# Patient Record
Sex: MALE | Race: OTHER
[De-identification: names, ages, dates, MRNs, and addresses within clinical notes are randomized per-mention and may not be internally consistent; named-entity substitution may affect disease eponyms.]

---

## 2022-01-08 ENCOUNTER — HOSPITAL ENCOUNTER (INPATIENT)
Dept: HOSPITAL 54 - ER | Age: 72
LOS: 2 days | Discharge: NURSING, REHAB OR LONG-TERM CARE | DRG: 202 | End: 2022-01-10
Attending: INTERNAL MEDICINE | Admitting: INTERNAL MEDICINE
Payer: COMMERCIAL

## 2022-01-08 VITALS — WEIGHT: 137 LBS | BODY MASS INDEX: 21.5 KG/M2 | HEIGHT: 67 IN

## 2022-01-08 DIAGNOSIS — K44.9: ICD-10-CM

## 2022-01-08 DIAGNOSIS — J20.9: Primary | ICD-10-CM

## 2022-01-08 DIAGNOSIS — L03.116: ICD-10-CM

## 2022-01-08 DIAGNOSIS — Z20.822: ICD-10-CM

## 2022-01-08 DIAGNOSIS — I10: ICD-10-CM

## 2022-01-08 DIAGNOSIS — J44.0: ICD-10-CM

## 2022-01-08 LAB
ALBUMIN SERPL BCP-MCNC: 3.4 G/DL (ref 3.4–5)
ALP SERPL-CCNC: 80 U/L (ref 46–116)
ALT SERPL W P-5'-P-CCNC: 26 U/L (ref 12–78)
AST SERPL W P-5'-P-CCNC: 26 U/L (ref 15–37)
BASOPHILS # BLD AUTO: 0.2 K/UL (ref 0–0.2)
BASOPHILS NFR BLD AUTO: 1.9 % (ref 0–2)
BILIRUB DIRECT SERPL-MCNC: 0.1 MG/DL (ref 0–0.2)
BILIRUB SERPL-MCNC: 0.4 MG/DL (ref 0.2–1)
BUN SERPL-MCNC: 13 MG/DL (ref 7–18)
CALCIUM SERPL-MCNC: 8.3 MG/DL (ref 8.5–10.1)
CHLORIDE SERPL-SCNC: 105 MMOL/L (ref 98–107)
CO2 SERPL-SCNC: 25 MMOL/L (ref 21–32)
CREAT SERPL-MCNC: 0.9 MG/DL (ref 0.6–1.3)
EOSINOPHIL NFR BLD AUTO: 15.3 % (ref 0–6)
EOSINOPHIL NFR BLD MANUAL: 10 % (ref 0–4)
GLUCOSE SERPL-MCNC: 95 MG/DL (ref 74–106)
HCT VFR BLD AUTO: 31 % (ref 39–51)
HGB BLD-MCNC: 9.6 G/DL (ref 13.5–17.5)
LYMPHOCYTES NFR BLD AUTO: 1.9 K/UL (ref 0.8–4.8)
LYMPHOCYTES NFR BLD AUTO: 16.4 % (ref 20–44)
LYMPHOCYTES NFR BLD MANUAL: 18 % (ref 16–48)
MCHC RBC AUTO-ENTMCNC: 31 G/DL (ref 31–36)
MCV RBC AUTO: 71 FL (ref 80–96)
MONOCYTES NFR BLD AUTO: 0.8 K/UL (ref 0.1–1.3)
MONOCYTES NFR BLD AUTO: 6.9 % (ref 2–12)
MONOCYTES NFR BLD MANUAL: 8 % (ref 0–11)
NEUTROPHILS # BLD AUTO: 6.8 K/UL (ref 1.8–8.9)
NEUTROPHILS NFR BLD AUTO: 59.5 % (ref 43–81)
NEUTS SEG NFR BLD MANUAL: 64 % (ref 42–76)
PLATELET # BLD AUTO: 432 K/UL (ref 150–450)
POTASSIUM SERPL-SCNC: 3.6 MMOL/L (ref 3.5–5.1)
PROT SERPL-MCNC: 7.5 G/DL (ref 6.4–8.2)
RBC # BLD AUTO: 4.41 MIL/UL (ref 4.5–6)
SODIUM SERPL-SCNC: 141 MMOL/L (ref 136–145)
WBC NRBC COR # BLD AUTO: 11.4 K/UL (ref 4.3–11)

## 2022-01-08 PROCEDURE — G0378 HOSPITAL OBSERVATION PER HR: HCPCS

## 2022-01-08 PROCEDURE — C9803 HOPD COVID-19 SPEC COLLECT: HCPCS

## 2022-01-09 LAB
BASOPHILS # BLD AUTO: 0 K/UL (ref 0–0.2)
BASOPHILS NFR BLD AUTO: 0.2 % (ref 0–2)
BUN SERPL-MCNC: 17 MG/DL (ref 7–18)
CALCIUM SERPL-MCNC: 8.1 MG/DL (ref 8.5–10.1)
CHLORIDE SERPL-SCNC: 104 MMOL/L (ref 98–107)
CO2 SERPL-SCNC: 24 MMOL/L (ref 21–32)
CREAT SERPL-MCNC: 0.9 MG/DL (ref 0.6–1.3)
EOSINOPHIL NFR BLD AUTO: 0.2 % (ref 0–6)
GLUCOSE SERPL-MCNC: 117 MG/DL (ref 74–106)
HCT VFR BLD AUTO: 31 % (ref 39–51)
HGB BLD-MCNC: 9.4 G/DL (ref 13.5–17.5)
LYMPHOCYTES NFR BLD AUTO: 1 K/UL (ref 0.8–4.8)
LYMPHOCYTES NFR BLD AUTO: 5.2 % (ref 20–44)
MCHC RBC AUTO-ENTMCNC: 31 G/DL (ref 31–36)
MCV RBC AUTO: 73 FL (ref 80–96)
MONOCYTES NFR BLD AUTO: 1.3 K/UL (ref 0.1–1.3)
MONOCYTES NFR BLD AUTO: 7 % (ref 2–12)
NEUTROPHILS # BLD AUTO: 16.4 K/UL (ref 1.8–8.9)
NEUTROPHILS NFR BLD AUTO: 87.4 % (ref 43–81)
PLATELET # BLD AUTO: 368 K/UL (ref 150–450)
POTASSIUM SERPL-SCNC: 3.1 MMOL/L (ref 3.5–5.1)
RBC # BLD AUTO: 4.25 MIL/UL (ref 4.5–6)
SODIUM SERPL-SCNC: 138 MMOL/L (ref 136–145)
WBC NRBC COR # BLD AUTO: 18.7 K/UL (ref 4.3–11)

## 2022-01-09 RX ADMIN — THERA TABS SCH UDTAB: TAB at 10:00

## 2022-01-09 RX ADMIN — DEXTROSE MONOHYDRATE SCH MLS/HR: 50 INJECTION, SOLUTION INTRAVENOUS at 23:10

## 2022-01-09 RX ADMIN — DEXTROSE MONOHYDRATE SCH MLS/HR: 50 INJECTION, SOLUTION INTRAVENOUS at 16:00

## 2022-01-09 RX ADMIN — DEXTROSE MONOHYDRATE SCH MLS/HR: 50 INJECTION, SOLUTION INTRAVENOUS at 21:55

## 2022-01-09 RX ADMIN — POTASSIUM CHLORIDE SCH MEQ: 1500 TABLET, EXTENDED RELEASE ORAL at 12:32

## 2022-01-09 RX ADMIN — Medication SCH MG: at 10:00

## 2022-01-09 RX ADMIN — ENOXAPARIN SODIUM SCH MG: 40 INJECTION SUBCUTANEOUS at 21:55

## 2022-01-09 RX ADMIN — AMLODIPINE BESYLATE SCH MG: 5 TABLET ORAL at 10:00

## 2022-01-09 RX ADMIN — POTASSIUM CHLORIDE SCH MEQ: 1500 TABLET, EXTENDED RELEASE ORAL at 10:00

## 2022-01-09 RX ADMIN — ENOXAPARIN SODIUM SCH MG: 40 INJECTION SUBCUTANEOUS at 01:00

## 2022-01-09 RX ADMIN — GUAIFENESIN SCH MG: 600 TABLET, EXTENDED RELEASE ORAL at 22:06

## 2022-01-09 RX ADMIN — DEXTROSE MONOHYDRATE SCH MLS/HR: 50 INJECTION, SOLUTION INTRAVENOUS at 01:30

## 2022-01-09 RX ADMIN — Medication SCH EACH: at 10:00

## 2022-01-09 RX ADMIN — DEXTROSE MONOHYDRATE SCH MLS/HR: 50 INJECTION, SOLUTION INTRAVENOUS at 01:00

## 2022-01-10 VITALS — DIASTOLIC BLOOD PRESSURE: 82 MMHG | SYSTOLIC BLOOD PRESSURE: 147 MMHG

## 2022-01-10 VITALS — DIASTOLIC BLOOD PRESSURE: 80 MMHG | SYSTOLIC BLOOD PRESSURE: 140 MMHG

## 2022-01-10 VITALS — DIASTOLIC BLOOD PRESSURE: 74 MMHG | SYSTOLIC BLOOD PRESSURE: 119 MMHG

## 2022-01-10 VITALS — SYSTOLIC BLOOD PRESSURE: 139 MMHG | DIASTOLIC BLOOD PRESSURE: 80 MMHG

## 2022-01-10 LAB
BUN SERPL-MCNC: 12 MG/DL (ref 7–18)
CALCIUM SERPL-MCNC: 7.9 MG/DL (ref 8.5–10.1)
CHLORIDE SERPL-SCNC: 101 MMOL/L (ref 98–107)
CO2 SERPL-SCNC: 22 MMOL/L (ref 21–32)
CREAT SERPL-MCNC: 1 MG/DL (ref 0.6–1.3)
GLUCOSE SERPL-MCNC: 143 MG/DL (ref 74–106)
POTASSIUM SERPL-SCNC: 3.9 MMOL/L (ref 3.5–5.1)
SODIUM SERPL-SCNC: 135 MMOL/L (ref 136–145)

## 2022-01-10 RX ADMIN — DEXTROSE MONOHYDRATE SCH MLS/HR: 50 INJECTION, SOLUTION INTRAVENOUS at 02:22

## 2022-01-10 RX ADMIN — AMLODIPINE BESYLATE SCH MG: 5 TABLET ORAL at 09:04

## 2022-01-10 RX ADMIN — Medication SCH EACH: at 09:03

## 2022-01-10 RX ADMIN — GUAIFENESIN SCH MG: 600 TABLET, EXTENDED RELEASE ORAL at 09:04

## 2022-01-10 RX ADMIN — THERA TABS SCH UDTAB: TAB at 09:04

## 2022-01-10 RX ADMIN — Medication SCH MG: at 09:04

## 2022-02-11 ENCOUNTER — HOSPITAL ENCOUNTER (EMERGENCY)
Dept: HOSPITAL 54 - ER | Age: 72
Discharge: HOME | End: 2022-02-11
Payer: COMMERCIAL

## 2022-02-11 VITALS — WEIGHT: 140 LBS | HEIGHT: 66 IN | BODY MASS INDEX: 22.5 KG/M2

## 2022-02-11 VITALS — SYSTOLIC BLOOD PRESSURE: 131 MMHG | DIASTOLIC BLOOD PRESSURE: 70 MMHG

## 2022-02-11 DIAGNOSIS — Z79.1: ICD-10-CM

## 2022-02-11 DIAGNOSIS — I10: ICD-10-CM

## 2022-02-11 DIAGNOSIS — M16.12: Primary | ICD-10-CM

## 2022-02-11 DIAGNOSIS — J44.9: ICD-10-CM

## 2022-02-11 DIAGNOSIS — R60.0: ICD-10-CM

## 2022-02-11 DIAGNOSIS — L03.116: ICD-10-CM

## 2022-02-11 DIAGNOSIS — Z79.899: ICD-10-CM

## 2022-02-11 NOTE — NUR
Written and verbal after care instructions given. Patient verbalizes 
understanding of instruction. VSS

## 2022-03-14 ENCOUNTER — HOSPITAL ENCOUNTER (EMERGENCY)
Dept: HOSPITAL 54 - ER | Age: 72
Discharge: ANOTHER HOSPITAL | End: 2022-03-14
Payer: COMMERCIAL

## 2022-03-14 VITALS — SYSTOLIC BLOOD PRESSURE: 159 MMHG | DIASTOLIC BLOOD PRESSURE: 101 MMHG

## 2022-03-14 VITALS — HEIGHT: 66 IN | BODY MASS INDEX: 22.34 KG/M2 | WEIGHT: 139 LBS

## 2022-03-14 DIAGNOSIS — R60.0: ICD-10-CM

## 2022-03-14 DIAGNOSIS — J20.9: ICD-10-CM

## 2022-03-14 DIAGNOSIS — L03.115: ICD-10-CM

## 2022-03-14 DIAGNOSIS — J44.0: Primary | ICD-10-CM

## 2022-03-14 DIAGNOSIS — Z20.822: ICD-10-CM

## 2022-03-14 DIAGNOSIS — L03.116: ICD-10-CM

## 2022-03-14 DIAGNOSIS — I10: ICD-10-CM

## 2022-03-14 LAB
ALBUMIN SERPL BCP-MCNC: 3.5 G/DL (ref 3.4–5)
ALP SERPL-CCNC: 79 U/L (ref 46–116)
ALT SERPL W P-5'-P-CCNC: 29 U/L (ref 12–78)
AST SERPL W P-5'-P-CCNC: 23 U/L (ref 15–37)
BASOPHILS # BLD AUTO: 0.2 K/UL (ref 0–0.2)
BASOPHILS NFR BLD AUTO: 2.9 % (ref 0–2)
BASOPHILS NFR BLD MANUAL: 1 % (ref 0–2)
BILIRUB DIRECT SERPL-MCNC: 0.1 MG/DL (ref 0–0.2)
BILIRUB SERPL-MCNC: 0.4 MG/DL (ref 0.2–1)
BILIRUB UR QL STRIP: NEGATIVE
BUN SERPL-MCNC: 16 MG/DL (ref 7–18)
CALCIUM SERPL-MCNC: 8.8 MG/DL (ref 8.5–10.1)
CHLORIDE SERPL-SCNC: 104 MMOL/L (ref 98–107)
CO2 SERPL-SCNC: 28 MMOL/L (ref 21–32)
COLOR UR: YELLOW
CREAT SERPL-MCNC: 0.8 MG/DL (ref 0.6–1.3)
EOSINOPHIL NFR BLD AUTO: 20.1 % (ref 0–6)
EOSINOPHIL NFR BLD MANUAL: 24 % (ref 0–4)
GLUCOSE SERPL-MCNC: 104 MG/DL (ref 74–106)
GLUCOSE UR STRIP-MCNC: NEGATIVE MG/DL
HCT VFR BLD AUTO: 34 % (ref 39–51)
HGB BLD-MCNC: 10.4 G/DL (ref 13.5–17.5)
LEUKOCYTE ESTERASE UR QL STRIP: NEGATIVE
LYMPHOCYTES NFR BLD AUTO: 1.7 K/UL (ref 0.8–4.8)
LYMPHOCYTES NFR BLD AUTO: 19.8 % (ref 20–44)
LYMPHOCYTES NFR BLD MANUAL: 27 % (ref 16–48)
MCHC RBC AUTO-ENTMCNC: 31 G/DL (ref 31–36)
MCV RBC AUTO: 70 FL (ref 80–96)
MONOCYTES NFR BLD AUTO: 0.8 K/UL (ref 0.1–1.3)
MONOCYTES NFR BLD AUTO: 9.1 % (ref 2–12)
MONOCYTES NFR BLD MANUAL: 8 % (ref 0–11)
NEUTROPHILS # BLD AUTO: 4.2 K/UL (ref 1.8–8.9)
NEUTROPHILS NFR BLD AUTO: 48.1 % (ref 43–81)
NEUTS SEG NFR BLD MANUAL: 40 % (ref 42–76)
NITRITE UR QL STRIP: NEGATIVE
PH UR STRIP: 7.5 [PH] (ref 5–8)
PLATELET # BLD AUTO: 453 K/UL (ref 150–450)
POTASSIUM SERPL-SCNC: 4.4 MMOL/L (ref 3.5–5.1)
PROT SERPL-MCNC: 7.5 G/DL (ref 6.4–8.2)
PROT UR QL STRIP: NEGATIVE MG/DL
RBC # BLD AUTO: 4.82 MIL/UL (ref 4.5–6)
RBC #/AREA URNS HPF: (no result) /HPF (ref 0–2)
SODIUM SERPL-SCNC: 139 MMOL/L (ref 136–145)
UROBILINOGEN UR STRIP-MCNC: 0.2 EU/DL
WBC #/AREA URNS HPF: (no result) /HPF (ref 0–3)
WBC NRBC COR # BLD AUTO: 8.7 K/UL (ref 4.3–11)

## 2022-03-14 PROCEDURE — 83605 ASSAY OF LACTIC ACID: CPT

## 2022-03-14 PROCEDURE — 84145 PROCALCITONIN (PCT): CPT

## 2022-03-14 PROCEDURE — 81001 URINALYSIS AUTO W/SCOPE: CPT

## 2022-03-14 PROCEDURE — 99285 EMERGENCY DEPT VISIT HI MDM: CPT

## 2022-03-14 PROCEDURE — 80076 HEPATIC FUNCTION PANEL: CPT

## 2022-03-14 PROCEDURE — U0003 INFECTIOUS AGENT DETECTION BY NUCLEIC ACID (DNA OR RNA); SEVERE ACUTE RESPIRATORY SYNDROME CORONAVIRUS 2 (SARS-COV-2) (CORONAVIRUS DISEASE [COVID-19]), AMPLIFIED PROBE TECHNIQUE, MAKING USE OF HIGH THROUGHPUT TECHNOLOGIES AS DESCRIBED BY CMS-2020-01-R: HCPCS

## 2022-03-14 PROCEDURE — C9803 HOPD COVID-19 SPEC COLLECT: HCPCS

## 2022-03-14 PROCEDURE — 71045 X-RAY EXAM CHEST 1 VIEW: CPT

## 2022-03-14 PROCEDURE — 86850 RBC ANTIBODY SCREEN: CPT

## 2022-03-14 PROCEDURE — 96365 THER/PROPH/DIAG IV INF INIT: CPT

## 2022-03-14 PROCEDURE — 85730 THROMBOPLASTIN TIME PARTIAL: CPT

## 2022-03-14 PROCEDURE — 84484 ASSAY OF TROPONIN QUANT: CPT

## 2022-03-14 PROCEDURE — 87086 URINE CULTURE/COLONY COUNT: CPT

## 2022-03-14 PROCEDURE — 85007 BL SMEAR W/DIFF WBC COUNT: CPT

## 2022-03-14 PROCEDURE — 87040 BLOOD CULTURE FOR BACTERIA: CPT

## 2022-03-14 PROCEDURE — 87426 SARSCOV CORONAVIRUS AG IA: CPT

## 2022-03-14 PROCEDURE — 80048 BASIC METABOLIC PNL TOTAL CA: CPT

## 2022-03-14 PROCEDURE — 94799 UNLISTED PULMONARY SVC/PX: CPT

## 2022-03-14 PROCEDURE — 93005 ELECTROCARDIOGRAM TRACING: CPT

## 2022-03-14 PROCEDURE — 83880 ASSAY OF NATRIURETIC PEPTIDE: CPT

## 2022-03-14 PROCEDURE — 36415 COLL VENOUS BLD VENIPUNCTURE: CPT

## 2022-03-14 PROCEDURE — 85025 COMPLETE CBC W/AUTO DIFF WBC: CPT

## 2022-03-14 PROCEDURE — 96375 TX/PRO/DX INJ NEW DRUG ADDON: CPT

## 2022-03-14 PROCEDURE — 94640 AIRWAY INHALATION TREATMENT: CPT

## 2022-03-14 NOTE — NUR
BIBA THIS 72YO MALE PATIENT PER [facility] WITH CC OF SOB, WHEEZING AND COUGHING 
WITH WHITISH SPUTUM. PLACED COMFORTABLY ON BED. VITALS CHECKED. PT ALERT, 
ORIENTED X4.

## 2022-03-14 NOTE — NUR
REPORT GIVEN TO [facility] AMBULANCE TECH RUBY (UNIT 275) RUN #[id number]. PATIENT WILL BE 
GOING TO [facility] . ENDORSEMENT DONE TO NURSE EPSTEIN. 
PATIENT WAS ALERT, ORIENTED X4. TRANSFERRED VIA GURNEY ON RA AT 96%.

## 2022-03-14 NOTE — NUR
-------------------------------------------------------------------------------

          *** Note undone in ED - 03/14/22 at 1105 by JESSICA ***          

-------------------------------------------------------------------------------

CALLED STEVE [facility]AS[facility] REGARDING PT ADMISSION STATUS. WAS NOTIFIED THAT THE PT'S 
CLINICALS WERE SENT TO [facility] AND AWAITING FEEDBACK FROM CHARGE 
NURSE

## 2022-04-23 ENCOUNTER — HOSPITAL ENCOUNTER (EMERGENCY)
Dept: HOSPITAL 54 - ER | Age: 72
Discharge: NURSING, REHAB OR LONG-TERM CARE | End: 2022-04-23
Payer: COMMERCIAL

## 2022-04-23 VITALS — SYSTOLIC BLOOD PRESSURE: 121 MMHG | DIASTOLIC BLOOD PRESSURE: 90 MMHG

## 2022-04-23 VITALS — WEIGHT: 106 LBS | HEIGHT: 66 IN | BODY MASS INDEX: 17.04 KG/M2

## 2022-04-23 DIAGNOSIS — Z20.822: ICD-10-CM

## 2022-04-23 DIAGNOSIS — Z79.899: ICD-10-CM

## 2022-04-23 DIAGNOSIS — D50.9: ICD-10-CM

## 2022-04-23 DIAGNOSIS — J44.9: Primary | ICD-10-CM

## 2022-04-23 DIAGNOSIS — I10: ICD-10-CM

## 2022-04-23 DIAGNOSIS — R60.0: ICD-10-CM

## 2022-04-23 LAB
ALBUMIN SERPL BCP-MCNC: 3.8 G/DL (ref 3.4–5)
ALP SERPL-CCNC: 88 U/L (ref 46–116)
ALT SERPL W P-5'-P-CCNC: 21 U/L (ref 12–78)
AST SERPL W P-5'-P-CCNC: 17 U/L (ref 15–37)
BASOPHILS # BLD AUTO: 0.2 K/UL (ref 0–0.2)
BASOPHILS NFR BLD AUTO: 2 % (ref 0–2)
BILIRUB DIRECT SERPL-MCNC: 0.1 MG/DL (ref 0–0.2)
BILIRUB SERPL-MCNC: 0.3 MG/DL (ref 0.2–1)
BUN SERPL-MCNC: 19 MG/DL (ref 7–18)
CALCIUM SERPL-MCNC: 8.8 MG/DL (ref 8.5–10.1)
CHLORIDE SERPL-SCNC: 108 MMOL/L (ref 98–107)
CO2 SERPL-SCNC: 25 MMOL/L (ref 21–32)
CREAT SERPL-MCNC: 1 MG/DL (ref 0.6–1.3)
EOSINOPHIL NFR BLD AUTO: 6.3 % (ref 0–6)
EOSINOPHIL NFR BLD MANUAL: 5 % (ref 0–4)
GLUCOSE SERPL-MCNC: 113 MG/DL (ref 74–106)
HCT VFR BLD AUTO: 36 % (ref 39–51)
HGB BLD-MCNC: 11 G/DL (ref 13.5–17.5)
LYMPHOCYTES NFR BLD AUTO: 1.1 K/UL (ref 0.8–4.8)
LYMPHOCYTES NFR BLD AUTO: 11.7 % (ref 20–44)
LYMPHOCYTES NFR BLD MANUAL: 7 % (ref 16–48)
MCHC RBC AUTO-ENTMCNC: 31 G/DL (ref 31–36)
MCV RBC AUTO: 73 FL (ref 80–96)
MONOCYTES NFR BLD AUTO: 0.9 K/UL (ref 0.1–1.3)
MONOCYTES NFR BLD AUTO: 10.4 % (ref 2–12)
MONOCYTES NFR BLD MANUAL: 8 % (ref 0–11)
NEUTROPHILS # BLD AUTO: 6.3 K/UL (ref 1.8–8.9)
NEUTROPHILS NFR BLD AUTO: 69.6 % (ref 43–81)
NEUTS BAND NFR BLD MANUAL: 2 % (ref 0–5)
NEUTS SEG NFR BLD MANUAL: 78 % (ref 42–76)
PLATELET # BLD AUTO: 347 K/UL (ref 150–450)
POTASSIUM SERPL-SCNC: 4.1 MMOL/L (ref 3.5–5.1)
PROT SERPL-MCNC: 7.6 G/DL (ref 6.4–8.2)
RBC # BLD AUTO: 4.93 MIL/UL (ref 4.5–6)
SODIUM SERPL-SCNC: 144 MMOL/L (ref 136–145)
WBC NRBC COR # BLD AUTO: 9.1 K/UL (ref 4.3–11)

## 2022-04-23 PROCEDURE — 83880 ASSAY OF NATRIURETIC PEPTIDE: CPT

## 2022-04-23 PROCEDURE — 36415 COLL VENOUS BLD VENIPUNCTURE: CPT

## 2022-04-23 PROCEDURE — 80076 HEPATIC FUNCTION PANEL: CPT

## 2022-04-23 PROCEDURE — C9803 HOPD COVID-19 SPEC COLLECT: HCPCS

## 2022-04-23 PROCEDURE — 93970 EXTREMITY STUDY: CPT

## 2022-04-23 PROCEDURE — 96374 THER/PROPH/DIAG INJ IV PUSH: CPT

## 2022-04-23 PROCEDURE — 94644 CONT INHLJ TX 1ST HOUR: CPT

## 2022-04-23 PROCEDURE — 93005 ELECTROCARDIOGRAM TRACING: CPT

## 2022-04-23 PROCEDURE — 99285 EMERGENCY DEPT VISIT HI MDM: CPT

## 2022-04-23 PROCEDURE — 80048 BASIC METABOLIC PNL TOTAL CA: CPT

## 2022-04-23 PROCEDURE — 71045 X-RAY EXAM CHEST 1 VIEW: CPT

## 2022-04-23 PROCEDURE — 85007 BL SMEAR W/DIFF WBC COUNT: CPT

## 2022-04-23 PROCEDURE — 84484 ASSAY OF TROPONIN QUANT: CPT

## 2022-04-23 PROCEDURE — 85025 COMPLETE CBC W/AUTO DIFF WBC: CPT

## 2022-04-23 PROCEDURE — 87426 SARSCOV CORONAVIRUS AG IA: CPT

## 2022-04-24 ENCOUNTER — HOSPITAL ENCOUNTER (EMERGENCY)
Dept: HOSPITAL 54 - ER | Age: 72
Discharge: ANOTHER HOSPITAL | End: 2022-04-24
Payer: COMMERCIAL

## 2022-04-24 VITALS — DIASTOLIC BLOOD PRESSURE: 88 MMHG | SYSTOLIC BLOOD PRESSURE: 147 MMHG

## 2022-04-24 VITALS — HEIGHT: 67 IN | WEIGHT: 145 LBS | BODY MASS INDEX: 22.76 KG/M2

## 2022-04-24 DIAGNOSIS — I10: ICD-10-CM

## 2022-04-24 DIAGNOSIS — J44.1: Primary | ICD-10-CM

## 2022-04-24 DIAGNOSIS — Z20.822: ICD-10-CM

## 2022-04-24 DIAGNOSIS — R06.09: ICD-10-CM

## 2022-04-24 DIAGNOSIS — R60.0: ICD-10-CM

## 2022-04-24 LAB
BASOPHILS # BLD AUTO: 0 K/UL (ref 0–0.2)
BASOPHILS NFR BLD AUTO: 0.2 % (ref 0–2)
BUN SERPL-MCNC: 30 MG/DL (ref 7–18)
CALCIUM SERPL-MCNC: 8.8 MG/DL (ref 8.5–10.1)
CHLORIDE SERPL-SCNC: 106 MMOL/L (ref 98–107)
CO2 SERPL-SCNC: 22 MMOL/L (ref 21–32)
CREAT SERPL-MCNC: 1.1 MG/DL (ref 0.6–1.3)
EOSINOPHIL NFR BLD AUTO: 0 % (ref 0–6)
GLUCOSE SERPL-MCNC: 161 MG/DL (ref 74–106)
HCT VFR BLD AUTO: 34 % (ref 39–51)
HGB BLD-MCNC: 10.5 G/DL (ref 13.5–17.5)
LYMPHOCYTES NFR BLD AUTO: 0.6 K/UL (ref 0.8–4.8)
LYMPHOCYTES NFR BLD AUTO: 9.8 % (ref 20–44)
LYMPHOCYTES NFR BLD MANUAL: 8 % (ref 16–48)
MCHC RBC AUTO-ENTMCNC: 31 G/DL (ref 31–36)
MCV RBC AUTO: 72 FL (ref 80–96)
MONOCYTES NFR BLD AUTO: 0.3 K/UL (ref 0.1–1.3)
MONOCYTES NFR BLD AUTO: 4.3 % (ref 2–12)
MONOCYTES NFR BLD MANUAL: 4 % (ref 0–11)
NEUTROPHILS # BLD AUTO: 5.7 K/UL (ref 1.8–8.9)
NEUTROPHILS NFR BLD AUTO: 85.7 % (ref 43–81)
NEUTS SEG NFR BLD MANUAL: 88 % (ref 42–76)
PLATELET # BLD AUTO: 338 K/UL (ref 150–450)
POTASSIUM SERPL-SCNC: 3.9 MMOL/L (ref 3.5–5.1)
RBC # BLD AUTO: 4.63 MIL/UL (ref 4.5–6)
SODIUM SERPL-SCNC: 141 MMOL/L (ref 136–145)
WBC NRBC COR # BLD AUTO: 6.6 K/UL (ref 4.3–11)

## 2022-04-24 PROCEDURE — 71045 X-RAY EXAM CHEST 1 VIEW: CPT

## 2022-04-24 PROCEDURE — 36415 COLL VENOUS BLD VENIPUNCTURE: CPT

## 2022-04-24 PROCEDURE — 96375 TX/PRO/DX INJ NEW DRUG ADDON: CPT

## 2022-04-24 PROCEDURE — 80048 BASIC METABOLIC PNL TOTAL CA: CPT

## 2022-04-24 PROCEDURE — 96365 THER/PROPH/DIAG IV INF INIT: CPT

## 2022-04-24 PROCEDURE — 94640 AIRWAY INHALATION TREATMENT: CPT

## 2022-04-24 PROCEDURE — 85025 COMPLETE CBC W/AUTO DIFF WBC: CPT

## 2022-04-24 PROCEDURE — 87426 SARSCOV CORONAVIRUS AG IA: CPT

## 2022-04-24 PROCEDURE — 85007 BL SMEAR W/DIFF WBC COUNT: CPT

## 2022-04-24 PROCEDURE — C9803 HOPD COVID-19 SPEC COLLECT: HCPCS

## 2022-04-24 PROCEDURE — 99291 CRITICAL CARE FIRST HOUR: CPT

## 2022-04-24 NOTE — NUR
[facility] AMBULANCE AT BEDSIDE FOR TRANSPORT TO [facility] 
IN STABLE CONDITION. NAD NOTED. PT LEFT WITH 2 EMT AND REPORT GIVEN.

## 2022-04-24 NOTE — NUR
[id number] FROM HOME C/O SOB WITH COUGH AND MUCUS X1DAY. 1NEB TX GIVEN PTA WITH 
SOME RELIEF. PT A/OX3 TOLERATING R/A WELL WITH NO SOB

## 2022-04-24 NOTE — NUR
PT ACCEPTED TO [facility] .

ADMITTING DR. SANCHEZ

REPORT NUMBER [phone]

## 2023-03-05 ENCOUNTER — HOSPITAL ENCOUNTER (EMERGENCY)
Dept: HOSPITAL 54 - ER | Age: 73
Discharge: NURSING, REHAB OR LONG-TERM CARE | End: 2023-03-05
Payer: COMMERCIAL

## 2023-03-05 VITALS — SYSTOLIC BLOOD PRESSURE: 118 MMHG | DIASTOLIC BLOOD PRESSURE: 68 MMHG

## 2023-03-05 VITALS — HEIGHT: 67 IN | BODY MASS INDEX: 22.76 KG/M2 | WEIGHT: 145 LBS

## 2023-03-05 DIAGNOSIS — Z79.899: ICD-10-CM

## 2023-03-05 DIAGNOSIS — J44.1: ICD-10-CM

## 2023-03-05 DIAGNOSIS — J06.9: Primary | ICD-10-CM

## 2023-03-05 DIAGNOSIS — R05.9: ICD-10-CM

## 2023-03-05 DIAGNOSIS — I10: ICD-10-CM

## 2023-03-05 DIAGNOSIS — Z20.822: ICD-10-CM

## 2023-03-05 DIAGNOSIS — R09.81: ICD-10-CM

## 2023-03-05 DIAGNOSIS — Z79.51: ICD-10-CM

## 2023-03-05 LAB
BASOPHILS # BLD AUTO: 0.1 K/UL (ref 0–0.2)
BASOPHILS NFR BLD AUTO: 1 % (ref 0–2)
BUN SERPL-MCNC: 19 MG/DL (ref 7–18)
CALCIUM SERPL-MCNC: 9 MG/DL (ref 8.5–10.1)
CHLORIDE SERPL-SCNC: 108 MMOL/L (ref 98–107)
CO2 SERPL-SCNC: 25 MMOL/L (ref 21–32)
CREAT SERPL-MCNC: 1 MG/DL (ref 0.6–1.3)
EOSINOPHIL NFR BLD AUTO: 13 % (ref 0–6)
GLUCOSE SERPL-MCNC: 101 MG/DL (ref 74–106)
HCT VFR BLD AUTO: 37 % (ref 39–51)
HGB BLD-MCNC: 11.6 G/DL (ref 13.5–17.5)
LYMPHOCYTES NFR BLD AUTO: 1.1 K/UL (ref 0.8–4.8)
LYMPHOCYTES NFR BLD AUTO: 13.7 % (ref 20–44)
MCHC RBC AUTO-ENTMCNC: 32 G/DL (ref 31–36)
MCV RBC AUTO: 81 FL (ref 80–96)
MONOCYTES NFR BLD AUTO: 0.8 K/UL (ref 0.1–1.3)
MONOCYTES NFR BLD AUTO: 9.3 % (ref 2–12)
NEUTROPHILS # BLD AUTO: 5.2 K/UL (ref 1.8–8.9)
NEUTROPHILS NFR BLD AUTO: 63 % (ref 43–81)
PLATELET # BLD AUTO: 273 K/UL (ref 150–450)
POTASSIUM SERPL-SCNC: 3.7 MMOL/L (ref 3.5–5.1)
RBC # BLD AUTO: 4.52 MIL/UL (ref 4.5–6)
SODIUM SERPL-SCNC: 141 MMOL/L (ref 136–145)
WBC NRBC COR # BLD AUTO: 8.3 K/UL (ref 4.3–11)

## 2023-03-05 PROCEDURE — 80048 BASIC METABOLIC PNL TOTAL CA: CPT

## 2023-03-05 PROCEDURE — C9803 HOPD COVID-19 SPEC COLLECT: HCPCS

## 2023-03-05 PROCEDURE — 71045 X-RAY EXAM CHEST 1 VIEW: CPT

## 2023-03-05 PROCEDURE — 94640 AIRWAY INHALATION TREATMENT: CPT

## 2023-03-05 PROCEDURE — 36415 COLL VENOUS BLD VENIPUNCTURE: CPT

## 2023-03-05 PROCEDURE — 85025 COMPLETE CBC W/AUTO DIFF WBC: CPT

## 2023-03-05 PROCEDURE — 99285 EMERGENCY DEPT VISIT HI MDM: CPT

## 2023-03-05 PROCEDURE — 87426 SARSCOV CORONAVIRUS AG IA: CPT

## 2023-03-05 NOTE — NUR
Patient discharged to facility in stable condition via [facility]. Written 
and verbal after care instructions given. Patient verbalizes understanding of 
instruction.

## 2023-03-05 NOTE — NUR
BIBRA 60 FROM [facility] C/O COUGH AND FEELING SICK FOR THE PAST DAY. AWAKE 
AND ALERT BREATHING UNLABORED. V/S WNL 98% RA.

## 2023-03-06 ENCOUNTER — HOSPITAL ENCOUNTER (INPATIENT)
Dept: HOSPITAL 12 - ER | Age: 73
LOS: 3 days | Discharge: NURSING, REHAB OR LONG-TERM CARE | DRG: 202 | End: 2023-03-09
Attending: INTERNAL MEDICINE | Admitting: INTERNAL MEDICINE
Payer: COMMERCIAL

## 2023-03-06 VITALS — HEIGHT: 66 IN | BODY MASS INDEX: 24.11 KG/M2 | WEIGHT: 150 LBS

## 2023-03-06 DIAGNOSIS — I10: ICD-10-CM

## 2023-03-06 DIAGNOSIS — M19.072: ICD-10-CM

## 2023-03-06 DIAGNOSIS — J44.0: ICD-10-CM

## 2023-03-06 DIAGNOSIS — Z77.22: ICD-10-CM

## 2023-03-06 DIAGNOSIS — J44.1: ICD-10-CM

## 2023-03-06 DIAGNOSIS — Z20.822: ICD-10-CM

## 2023-03-06 DIAGNOSIS — B96.89: ICD-10-CM

## 2023-03-06 DIAGNOSIS — J20.8: Primary | ICD-10-CM

## 2023-03-06 DIAGNOSIS — M19.071: ICD-10-CM

## 2023-03-06 DIAGNOSIS — Z79.899: ICD-10-CM

## 2023-03-06 LAB
BUN SERPL-MCNC: 25 MG/DL (ref 7–18)
CHLORIDE SERPL-SCNC: 107 MMOL/L (ref 98–107)
CO2 SERPL-SCNC: 28 MMOL/L (ref 21–32)
CREAT SERPL-MCNC: 1.1 MG/DL (ref 0.6–1.3)
GLUCOSE SERPL-MCNC: 100 MG/DL (ref 74–106)
HCT VFR BLD AUTO: 39.2 % (ref 36.7–47.1)
MCH RBC QN AUTO: 25.6 UUG (ref 23.8–33.4)
MCV RBC AUTO: 81.1 FL (ref 73–96.2)
PLATELET # BLD AUTO: 290 K/UL (ref 152–348)
POTASSIUM SERPL-SCNC: 3.9 MMOL/L (ref 3.5–5.1)

## 2023-03-06 PROCEDURE — A4663 DIALYSIS BLOOD PRESSURE CUFF: HCPCS

## 2023-03-06 PROCEDURE — G0378 HOSPITAL OBSERVATION PER HR: HCPCS

## 2023-03-07 VITALS — DIASTOLIC BLOOD PRESSURE: 60 MMHG | SYSTOLIC BLOOD PRESSURE: 124 MMHG

## 2023-03-07 VITALS — SYSTOLIC BLOOD PRESSURE: 128 MMHG | DIASTOLIC BLOOD PRESSURE: 62 MMHG

## 2023-03-07 VITALS — SYSTOLIC BLOOD PRESSURE: 123 MMHG | DIASTOLIC BLOOD PRESSURE: 66 MMHG

## 2023-03-07 RX ADMIN — MONTELUKAST SCH MG: 10 TABLET, FILM COATED ORAL at 21:48

## 2023-03-07 RX ADMIN — GUAIFENESIN AND CODEINE PHOSPHATE PRN ML: 100; 10 SOLUTION ORAL at 22:31

## 2023-03-07 RX ADMIN — DOCUSATE SODIUM SCH MG: 100 CAPSULE, LIQUID FILLED ORAL at 21:00

## 2023-03-08 VITALS — DIASTOLIC BLOOD PRESSURE: 60 MMHG | SYSTOLIC BLOOD PRESSURE: 130 MMHG

## 2023-03-08 VITALS — DIASTOLIC BLOOD PRESSURE: 72 MMHG | SYSTOLIC BLOOD PRESSURE: 136 MMHG

## 2023-03-08 VITALS — DIASTOLIC BLOOD PRESSURE: 69 MMHG | SYSTOLIC BLOOD PRESSURE: 122 MMHG

## 2023-03-08 VITALS — SYSTOLIC BLOOD PRESSURE: 143 MMHG | DIASTOLIC BLOOD PRESSURE: 72 MMHG

## 2023-03-08 VITALS — SYSTOLIC BLOOD PRESSURE: 135 MMHG | DIASTOLIC BLOOD PRESSURE: 70 MMHG

## 2023-03-08 LAB
ALP SERPL-CCNC: 80 U/L (ref 50–136)
ALT SERPL W/O P-5'-P-CCNC: 23 U/L (ref 16–63)
AST SERPL-CCNC: 23 U/L (ref 15–37)
BILIRUB SERPL-MCNC: 0.3 MG/DL (ref 0.2–1)
BUN SERPL-MCNC: 23 MG/DL (ref 7–18)
CHLORIDE SERPL-SCNC: 106 MMOL/L (ref 98–107)
CHOLEST SERPL-MCNC: 129 MG/DL (ref ?–200)
CO2 SERPL-SCNC: 26 MMOL/L (ref 21–32)
CREAT SERPL-MCNC: 0.9 MG/DL (ref 0.6–1.3)
GLUCOSE SERPL-MCNC: 144 MG/DL (ref 74–106)
HCT VFR BLD AUTO: 34.9 % (ref 36.7–47.1)
HDLC SERPL-MCNC: 62 MG/DL (ref 40–60)
MAGNESIUM SERPL-MCNC: 2.1 MG/DL (ref 1.8–2.4)
MCH RBC QN AUTO: 25.8 UUG (ref 23.8–33.4)
MCV RBC AUTO: 80 FL (ref 73–96.2)
PHOSPHATE SERPL-MCNC: 4.1 MG/DL (ref 2.5–4.9)
PLATELET # BLD AUTO: 264 K/UL (ref 152–348)
POTASSIUM SERPL-SCNC: 4 MMOL/L (ref 3.5–5.1)
TRIGL SERPL-MCNC: 47 MG/DL (ref 30–150)
TSH SERPL DL<=0.005 MIU/L-ACNC: 1.03 MIU/ML (ref 0.36–3.74)
WS STN SPEC: 6.3 G/DL (ref 6.4–8.2)

## 2023-03-08 RX ADMIN — GUAIFENESIN AND CODEINE PHOSPHATE PRN ML: 100; 10 SOLUTION ORAL at 21:18

## 2023-03-08 RX ADMIN — FLUTICASONE PROPIONATE SCH GM: 50 SPRAY, METERED NASAL at 08:44

## 2023-03-08 RX ADMIN — MONTELUKAST SCH MG: 10 TABLET, FILM COATED ORAL at 20:26

## 2023-03-08 RX ADMIN — FUROSEMIDE SCH MG: 40 TABLET ORAL at 08:44

## 2023-03-08 RX ADMIN — GUAIFENESIN AND CODEINE PHOSPHATE PRN ML: 100; 10 SOLUTION ORAL at 08:45

## 2023-03-08 RX ADMIN — DOCUSATE SODIUM SCH MG: 100 CAPSULE, LIQUID FILLED ORAL at 20:25

## 2023-03-08 RX ADMIN — PANTOPRAZOLE SODIUM SCH MG: 40 TABLET, DELAYED RELEASE ORAL at 06:02

## 2023-03-08 RX ADMIN — AMLODIPINE BESYLATE SCH MG: 5 TABLET ORAL at 08:44

## 2023-03-09 VITALS — SYSTOLIC BLOOD PRESSURE: 112 MMHG | DIASTOLIC BLOOD PRESSURE: 59 MMHG

## 2023-03-09 VITALS — SYSTOLIC BLOOD PRESSURE: 144 MMHG | DIASTOLIC BLOOD PRESSURE: 67 MMHG

## 2023-03-09 VITALS — SYSTOLIC BLOOD PRESSURE: 130 MMHG | DIASTOLIC BLOOD PRESSURE: 72 MMHG

## 2023-03-09 VITALS — DIASTOLIC BLOOD PRESSURE: 81 MMHG | SYSTOLIC BLOOD PRESSURE: 152 MMHG

## 2023-03-09 RX ADMIN — FLUTICASONE PROPIONATE SCH GM: 50 SPRAY, METERED NASAL at 08:43

## 2023-03-09 RX ADMIN — FUROSEMIDE SCH MG: 40 TABLET ORAL at 08:42

## 2023-03-09 RX ADMIN — PANTOPRAZOLE SODIUM SCH MG: 40 TABLET, DELAYED RELEASE ORAL at 06:27

## 2023-03-09 RX ADMIN — AMLODIPINE BESYLATE SCH MG: 5 TABLET ORAL at 08:43

## 2023-05-25 ENCOUNTER — HOSPITAL ENCOUNTER (INPATIENT)
Dept: HOSPITAL 54 - ER | Age: 73
LOS: 2 days | Discharge: NURSING, REHAB OR LONG-TERM CARE | DRG: 190 | End: 2023-05-27
Attending: INTERNAL MEDICINE | Admitting: INTERNAL MEDICINE
Payer: COMMERCIAL

## 2023-05-25 VITALS — WEIGHT: 146 LBS | BODY MASS INDEX: 23.46 KG/M2 | HEIGHT: 66 IN

## 2023-05-25 VITALS — SYSTOLIC BLOOD PRESSURE: 119 MMHG | DIASTOLIC BLOOD PRESSURE: 73 MMHG

## 2023-05-25 VITALS — SYSTOLIC BLOOD PRESSURE: 149 MMHG | DIASTOLIC BLOOD PRESSURE: 69 MMHG

## 2023-05-25 DIAGNOSIS — J44.0: ICD-10-CM

## 2023-05-25 DIAGNOSIS — J96.01: ICD-10-CM

## 2023-05-25 DIAGNOSIS — J44.1: Primary | ICD-10-CM

## 2023-05-25 DIAGNOSIS — L03.116: ICD-10-CM

## 2023-05-25 DIAGNOSIS — R60.9: ICD-10-CM

## 2023-05-25 DIAGNOSIS — I10: ICD-10-CM

## 2023-05-25 DIAGNOSIS — Z79.899: ICD-10-CM

## 2023-05-25 DIAGNOSIS — J20.9: ICD-10-CM

## 2023-05-25 LAB
ALBUMIN SERPL BCP-MCNC: 4 G/DL (ref 3.4–5)
ALP SERPL-CCNC: 89 U/L (ref 46–116)
ALT SERPL W P-5'-P-CCNC: 27 U/L (ref 12–78)
AST SERPL W P-5'-P-CCNC: 20 U/L (ref 15–37)
BASOPHILS # BLD AUTO: 0.1 K/UL (ref 0–0.2)
BASOPHILS NFR BLD AUTO: 1.9 % (ref 0–2)
BILIRUB DIRECT SERPL-MCNC: 0.1 MG/DL (ref 0–0.2)
BILIRUB SERPL-MCNC: 0.4 MG/DL (ref 0.2–1)
BUN SERPL-MCNC: 34 MG/DL (ref 7–18)
CALCIUM SERPL-MCNC: 9.4 MG/DL (ref 8.5–10.1)
CHLORIDE SERPL-SCNC: 106 MMOL/L (ref 98–107)
CO2 SERPL-SCNC: 23 MMOL/L (ref 21–32)
CREAT SERPL-MCNC: 1.2 MG/DL (ref 0.6–1.3)
EOSINOPHIL NFR BLD AUTO: 12.6 % (ref 0–6)
GLUCOSE SERPL-MCNC: 105 MG/DL (ref 74–106)
HCT VFR BLD AUTO: 39 % (ref 39–51)
HGB BLD-MCNC: 12.8 G/DL (ref 13.5–17.5)
LYMPHOCYTES NFR BLD AUTO: 1.9 K/UL (ref 0.8–4.8)
LYMPHOCYTES NFR BLD AUTO: 25.2 % (ref 20–44)
MCHC RBC AUTO-ENTMCNC: 33 G/DL (ref 31–36)
MCV RBC AUTO: 82 FL (ref 80–96)
MONOCYTES NFR BLD AUTO: 0.6 K/UL (ref 0.1–1.3)
MONOCYTES NFR BLD AUTO: 8.1 % (ref 2–12)
NEUTROPHILS # BLD AUTO: 4 K/UL (ref 1.8–8.9)
NEUTROPHILS NFR BLD AUTO: 52.2 % (ref 43–81)
PLATELET # BLD AUTO: 287 K/UL (ref 150–450)
POTASSIUM SERPL-SCNC: 3.4 MMOL/L (ref 3.5–5.1)
PROT SERPL-MCNC: 7.6 G/DL (ref 6.4–8.2)
RBC # BLD AUTO: 4.81 MIL/UL (ref 4.5–6)
SODIUM SERPL-SCNC: 142 MMOL/L (ref 136–145)
WBC NRBC COR # BLD AUTO: 7.7 K/UL (ref 4.3–11)

## 2023-05-25 PROCEDURE — G0378 HOSPITAL OBSERVATION PER HR: HCPCS

## 2023-05-25 PROCEDURE — C9803 HOPD COVID-19 SPEC COLLECT: HCPCS

## 2023-05-25 RX ADMIN — DOXYCYCLINE HYCLATE SCH MG: 100 TABLET, COATED ORAL at 17:59

## 2023-05-25 RX ADMIN — DOXYCYCLINE HYCLATE SCH MG: 100 TABLET, COATED ORAL at 12:05

## 2023-05-25 RX ADMIN — ENOXAPARIN SODIUM SCH MG: 40 INJECTION SUBCUTANEOUS at 12:06

## 2023-05-25 NOTE — NUR
RN NOTES:



RELAYED POTASSIUM LEVEL 3.4.  DR. LITTLEJOHN MADE AWARE AND ORDERED POTASSIUM CHLORIDE 40MEQ PO 
ONCE.  ORDERS NOTED AND CARRIED OUT.

## 2023-05-25 NOTE — NUR
MS/DANNY RN ADMISSION NOTES:



RECEIVED PATIENT FROM ER VIA [facility].  PATIENT IS A/O X4, ABLETO 

-------------------------------------------------------------------------------

Addendum: 05/25/23 at 1444 by GISELA LYONS RN

-------------------------------------------------------------------------------

MS/TELE RN ADMISSION NOTES:



RECEIVED PATIENT FROM ER VIA [facility].  PATIENT IS A/O X4, ABLE TO MAKE NEEDS KNOWN.   PATIENT 
ORIENTED TO ROOM AND HOW TO USE THE CALL LIGHT.  O2 AT 1L/HR, BREATHING EVENLY AND 
UNLABORED.  NO SOB, WITH NON PRODUCTIVE COUGH.  ALL BELONGINGS ACCOUNTED FOR,  BELONGINGS 
SHEET SIGNED.  IV ACCESS ON LAC G20 SL, INTACT AND PATENT.  VS TAKEN AS FOLLOWS: /67, 
, RR 18, TEMP98.4, SPO2 93%.  SKIN ASSESSMENT DONE, PHOTOS TAKEN AND PLACED IN CHART.  
WHEEZING NOTED ON LUNGS UPON AUSCULTATION.  BOWEL SOUNDS PRESENT.  NO COMPLAINTS OF PAIN.  
SAFETY PRECAUTIONS IN PLACE:  BED IN LOW, LOCKED POSITION; SIDE RAILS UPX2; CALL LIGHT 
WITHIN REACH.  WILL CONTINUE TO MONITOR.

## 2023-05-25 NOTE — NUR
MS/TELE RN CLOSING NOTES:



PT AWAKE IN BED, ALERT AND ORIENTED X4. ABLE TO MAKE NEEDS KNOWN,NO SOB DENIES ANY PAIN AT 
THIS TIME.  AFEBRILE.  NON LABORED BREATHING WITH 02 @ 1L/HR, TOLERATING WELL. ON TELE 
MONITOR WITH SINUS RHYTHM AT 83 BPM. IV ACCESS ON LAC GAUGE 20, PATENT, INTACT AND SALINE 
LOCKED. ALL DUE MEDS/TREATMENT GIVEN, NEEDS ATTENDED.SAFETY MEASURES MAINTAINED: BED LOCKED 
AND IN LOWEST POSITION,SIDE RAILS UP X 2. WILL ENDORSE TO PM SHIFT FOR CONTINUITY OF CARE.

## 2023-05-25 NOTE — NUR
BIBRA60 [facility]OU[facility]NS FOR SOB, O2SAT 90% ROOM AIR, 5 ALBUTEROL GIVEN PTA NOW 
95%ROOM AIR. PT AAOX4, VITALS CHECKED.

## 2023-05-25 NOTE — NUR
TELE RN OPENING NOTES;



RECEIVED PATIENT AWAKE IN BED, BED IN LOW POSITION, CALL LIGHTS WITHIN REACH, NO COMPLAIN OF 
PAIN AND DISCOMFORT AT THIS TIME, ON 02 INHALATION AT 2LPM SATURATING WELL, PATIENT IS A/O 
X4 ABLE TO MAKE NEEDS KNOWN, ON TELE MONITOR- SR-80, IV LINE AT RAC#18SL, PATIENT KEPT CLEAN 
AND DRY ALL NEEDS MET WILL CONTINUE TO MONITOR.

## 2023-05-26 VITALS — SYSTOLIC BLOOD PRESSURE: 120 MMHG | DIASTOLIC BLOOD PRESSURE: 72 MMHG

## 2023-05-26 VITALS — DIASTOLIC BLOOD PRESSURE: 76 MMHG | SYSTOLIC BLOOD PRESSURE: 132 MMHG

## 2023-05-26 VITALS — DIASTOLIC BLOOD PRESSURE: 75 MMHG | SYSTOLIC BLOOD PRESSURE: 137 MMHG

## 2023-05-26 VITALS — SYSTOLIC BLOOD PRESSURE: 130 MMHG | DIASTOLIC BLOOD PRESSURE: 64 MMHG

## 2023-05-26 VITALS — SYSTOLIC BLOOD PRESSURE: 122 MMHG | DIASTOLIC BLOOD PRESSURE: 72 MMHG

## 2023-05-26 LAB
ALBUMIN SERPL BCP-MCNC: 3.2 G/DL (ref 3.4–5)
ALP SERPL-CCNC: 77 U/L (ref 46–116)
ALT SERPL W P-5'-P-CCNC: 23 U/L (ref 12–78)
AST SERPL W P-5'-P-CCNC: 15 U/L (ref 15–37)
BASOPHILS # BLD AUTO: 0 K/UL (ref 0–0.2)
BASOPHILS NFR BLD AUTO: 0 % (ref 0–2)
BILIRUB SERPL-MCNC: 0.3 MG/DL (ref 0.2–1)
BUN SERPL-MCNC: 33 MG/DL (ref 7–18)
CALCIUM SERPL-MCNC: 9.2 MG/DL (ref 8.5–10.1)
CHLORIDE SERPL-SCNC: 107 MMOL/L (ref 98–107)
CO2 SERPL-SCNC: 24 MMOL/L (ref 21–32)
CREAT SERPL-MCNC: 0.9 MG/DL (ref 0.6–1.3)
EOSINOPHIL NFR BLD AUTO: 0 % (ref 0–6)
GLUCOSE SERPL-MCNC: 161 MG/DL (ref 74–106)
HCT VFR BLD AUTO: 36 % (ref 39–51)
HGB BLD-MCNC: 12 G/DL (ref 13.5–17.5)
LYMPHOCYTES NFR BLD AUTO: 0.5 K/UL (ref 0.8–4.8)
LYMPHOCYTES NFR BLD AUTO: 5.9 % (ref 20–44)
MCHC RBC AUTO-ENTMCNC: 33 G/DL (ref 31–36)
MCV RBC AUTO: 82 FL (ref 80–96)
MONOCYTES NFR BLD AUTO: 0.2 K/UL (ref 0.1–1.3)
MONOCYTES NFR BLD AUTO: 1.6 % (ref 2–12)
NEUTROPHILS # BLD AUTO: 8.5 K/UL (ref 1.8–8.9)
NEUTROPHILS NFR BLD AUTO: 92.5 % (ref 43–81)
PLATELET # BLD AUTO: 280 K/UL (ref 150–450)
POTASSIUM SERPL-SCNC: 4.1 MMOL/L (ref 3.5–5.1)
PROT SERPL-MCNC: 6.5 G/DL (ref 6.4–8.2)
RBC # BLD AUTO: 4.4 MIL/UL (ref 4.5–6)
SODIUM SERPL-SCNC: 138 MMOL/L (ref 136–145)
WBC NRBC COR # BLD AUTO: 9.2 K/UL (ref 4.3–11)

## 2023-05-26 RX ADMIN — LACTOBACILLUS TAB SCH EACH: TAB at 08:47

## 2023-05-26 RX ADMIN — ENOXAPARIN SODIUM SCH MG: 40 INJECTION SUBCUTANEOUS at 08:49

## 2023-05-26 RX ADMIN — Medication SCH MG: at 08:47

## 2023-05-26 RX ADMIN — ALBUTEROL SULFATE SCH MG: 2.5 SOLUTION RESPIRATORY (INHALATION) at 13:10

## 2023-05-26 RX ADMIN — DOXYCYCLINE HYCLATE SCH MG: 100 TABLET, COATED ORAL at 08:43

## 2023-05-26 RX ADMIN — THERA TABS SCH UDTAB: TAB at 08:44

## 2023-05-26 RX ADMIN — ALBUTEROL SULFATE SCH MG: 2.5 SOLUTION RESPIRATORY (INHALATION) at 07:34

## 2023-05-26 RX ADMIN — Medication SCH MG: at 19:56

## 2023-05-26 RX ADMIN — ALBUTEROL SULFATE SCH MG: 2.5 SOLUTION RESPIRATORY (INHALATION) at 19:56

## 2023-05-26 RX ADMIN — Medication SCH MG: at 07:34

## 2023-05-26 RX ADMIN — DOXYCYCLINE HYCLATE SCH MG: 100 TABLET, COATED ORAL at 16:17

## 2023-05-26 RX ADMIN — Medication SCH MG: at 13:10

## 2023-05-26 RX ADMIN — AMLODIPINE BESYLATE SCH MG: 5 TABLET ORAL at 08:47

## 2023-05-26 NOTE — NUR
TELE RN OPENING NOTES - RECEIVED PATIENT AWAKE, HOB IN SEMI-GRIFFITHS'S. A/O X4. NO RESPIRATORY 
DISTRESS AT THIS TIME, VERBALIZED HE STILL HAS ON AND OFF DYSPNEA AND COUGH. ON O2 AT 2LPM 
VIA NASAL CANULA. NO C/O PAIN OR DISCOMFORT. ON TELE MONITOR READING SINUS RHYTHM AT 83 BPM. 
HAS RIGHT FOREARM IV ACCESS #18G AND SALINE LOCKED. NO S/S OF INFILTRATION NOTED. SAFETY 
PRECAUTIONS IN PLACE: BED LOCKED AND IN LOW POSITION, SIDE RAILS UP X2, CALL LIGHT WITHIN 
REACH. WILL CONTINUE PLAN OF CARE.

## 2023-05-26 NOTE — NUR
MS  RN CLOSING  NOTES: 



PATIENT AWAKE IN BED, BED IN LOW POSITION CALL LIGHTS WITHIN REACH, NO COMPLAIN OF PAIN AND 
DISCOMFORT AT THIS TIME ,ON O2 INHALATION AT 2LPM SATURATING WELL. NO SOB WAS OBSERVED, 
PATIENT IS A/O X4 ABLE TO MAKE NEEDS KNOWN, ON TELE MONITOR-SR-61,   IV LINE AT RAC#18SL, 
PATIENT KEPT CLEAN AND DRY ALL NEEDS MET, ENDORSE TO INCOMING SHIFT.

## 2023-05-26 NOTE — NUR
NOTIFIED DR. LITTLEJOHN RE: MEDS THAT WERE NOT RECONCILED. ORDERED TO START THE MEDS BELOW. NOTED 
AND CARRIED OUT.

- FUROSEMIDE 40MG PO DAILY

- LOSARTAN POTASSIUM 100MG PO DAILY

- MONTELUKAST SODIUM 10MG PO DAILY

## 2023-05-26 NOTE — NUR
RT NOTE

Patient took neb tx. off early due to coughing. Stating that the neb tx. is causing him to 
cough.

## 2023-05-26 NOTE — NUR
TELE RN OPENING NOTES;



RECEIVED PATIENT AWAKE IN BED, A/OX4. ON 2L OF O2 VIA NC, NO S/S OF SOB AND ACUTE DISTRESS, 
DENIES PAIN AT THIS TIME. ON TELE MONITOR, CURRENT READING IS SR, HR=80. IV ACCESS  AT 
RAC#18SL, PATENT AND INTACT. ALL SAFETY MEASURES IN PLACE, CALL LIGHT AND TABLE WITHIN EASY 
REACH, WILL CONT WITH PLAN OF CARE DURING SHIFT.

## 2023-05-26 NOTE — NUR
TELE RN CLOSING NOTES:



PATIENT AWAKE IN BED, A/OX4, ABLE TO MAKE NEEDS KNOWN. ON 2L OF O2 VIA NC, NO S/S OF SOB AND 
ACUTE DISTRESS, DENIES PAIN AT THIS TIME. ON TELE MONITOR, CURRENT READING IS SR, HR=66. IV 
ACCESS  AT RAC#18 SL, PATENT AND INTACT. URINAL EMPTIED, TOTAL OUTPUT= 500 CC. ALL NEEDS 
MET, DUE MEDS GIVEN. ALL SAFETY MEASURES IN PLACE, CALL LIGHT AND TABLE WITHIN EASY REACH, 
WILL ENDORSE TO PM SHIFT.

## 2023-05-27 VITALS — DIASTOLIC BLOOD PRESSURE: 78 MMHG | SYSTOLIC BLOOD PRESSURE: 142 MMHG

## 2023-05-27 VITALS — SYSTOLIC BLOOD PRESSURE: 130 MMHG | DIASTOLIC BLOOD PRESSURE: 72 MMHG

## 2023-05-27 VITALS — DIASTOLIC BLOOD PRESSURE: 66 MMHG | SYSTOLIC BLOOD PRESSURE: 156 MMHG

## 2023-05-27 VITALS — SYSTOLIC BLOOD PRESSURE: 134 MMHG | DIASTOLIC BLOOD PRESSURE: 77 MMHG

## 2023-05-27 RX ADMIN — Medication SCH MG: at 14:09

## 2023-05-27 RX ADMIN — AMLODIPINE BESYLATE SCH MG: 5 TABLET ORAL at 09:13

## 2023-05-27 RX ADMIN — LACTOBACILLUS TAB SCH EACH: TAB at 09:13

## 2023-05-27 RX ADMIN — ALBUTEROL SULFATE SCH MG: 2.5 SOLUTION RESPIRATORY (INHALATION) at 00:59

## 2023-05-27 RX ADMIN — Medication SCH MG: at 09:13

## 2023-05-27 RX ADMIN — THERA TABS SCH UDTAB: TAB at 09:13

## 2023-05-27 RX ADMIN — Medication SCH MG: at 07:37

## 2023-05-27 RX ADMIN — ALBUTEROL SULFATE SCH MG: 2.5 SOLUTION RESPIRATORY (INHALATION) at 07:37

## 2023-05-27 RX ADMIN — ALBUTEROL SULFATE SCH MG: 2.5 SOLUTION RESPIRATORY (INHALATION) at 14:09

## 2023-05-27 RX ADMIN — Medication SCH MG: at 00:59

## 2023-05-27 RX ADMIN — ENOXAPARIN SODIUM SCH MG: 40 INJECTION SUBCUTANEOUS at 09:17

## 2023-05-27 RX ADMIN — DOXYCYCLINE HYCLATE SCH MG: 100 TABLET, COATED ORAL at 09:14

## 2023-05-27 NOTE — NUR
MS RN CLOSING NOTES - PATIENT RESTING IN BED, ABLE TO VERBALIZE NEEDS. KEPT HOB ELEVATED. NO 
C/O SOB OR , SATURATING WELL ON O2 AT 1LPM. PRODUCTIVE COUGH NOTED. AFEBRILE. RIGHT 
FOREARM IV ACCESS INTACT, PATENT AND FLUSHING. ALL DUE MEDS GIVEN AND NEEDS ATTENDED. SAFETY 
PRECAUTIONS MAINTAINED. WILL ENDORSE TO NEXT SHIFT RN FOR SABRINA.

## 2023-05-27 NOTE — NUR
ms rn

received on bed,awake,alert,oriented x4,not in any form of distress, respirations even and 
unlabored,no sob noted,  lungs are diminished,abdomen soft,positive bowel sounds, denies 
pain at this time, will monitor patient's condition.

## 2023-07-02 ENCOUNTER — HOSPITAL ENCOUNTER (EMERGENCY)
Dept: HOSPITAL 54 - ER | Age: 73
Discharge: HOME | End: 2023-07-02
Payer: MEDICARE

## 2023-07-02 VITALS — WEIGHT: 145 LBS | HEIGHT: 67 IN | BODY MASS INDEX: 22.76 KG/M2

## 2023-07-02 VITALS — TEMPERATURE: 98.9 F | DIASTOLIC BLOOD PRESSURE: 80 MMHG | SYSTOLIC BLOOD PRESSURE: 110 MMHG

## 2023-07-02 DIAGNOSIS — F17.200: ICD-10-CM

## 2023-07-02 DIAGNOSIS — Z20.822: ICD-10-CM

## 2023-07-02 DIAGNOSIS — Z79.899: ICD-10-CM

## 2023-07-02 DIAGNOSIS — I10: ICD-10-CM

## 2023-07-02 DIAGNOSIS — Z79.51: ICD-10-CM

## 2023-07-02 DIAGNOSIS — J44.1: Primary | ICD-10-CM

## 2023-07-02 PROCEDURE — 99284 EMERGENCY DEPT VISIT MOD MDM: CPT

## 2023-07-02 PROCEDURE — 87426 SARSCOV CORONAVIRUS AG IA: CPT

## 2023-07-02 PROCEDURE — 71045 X-RAY EXAM CHEST 1 VIEW: CPT

## 2023-07-02 PROCEDURE — C9803 HOPD COVID-19 SPEC COLLECT: HCPCS

## 2023-07-02 PROCEDURE — 94640 AIRWAY INHALATION TREATMENT: CPT

## 2023-07-02 NOTE — NUR
BIBRA60 FROM [facility] FOR COUGH/CONGESTION X 1 WEEK. PLACED IN BED, AAOX4, 
BREATHING EVEN AND UNLABORED SATURATING AT 96%RA